# Patient Record
Sex: MALE | Race: OTHER | NOT HISPANIC OR LATINO
[De-identification: names, ages, dates, MRNs, and addresses within clinical notes are randomized per-mention and may not be internally consistent; named-entity substitution may affect disease eponyms.]

---

## 2021-07-14 PROBLEM — Z00.00 ENCOUNTER FOR PREVENTIVE HEALTH EXAMINATION: Status: ACTIVE | Noted: 2021-07-14

## 2021-07-16 ENCOUNTER — APPOINTMENT (OUTPATIENT)
Dept: GASTROENTEROLOGY | Facility: CLINIC | Age: 56
End: 2021-07-16
Payer: MEDICAID

## 2021-07-16 VITALS
TEMPERATURE: 97.3 F | RESPIRATION RATE: 16 BRPM | SYSTOLIC BLOOD PRESSURE: 144 MMHG | DIASTOLIC BLOOD PRESSURE: 78 MMHG | BODY MASS INDEX: 31.18 KG/M2 | HEART RATE: 78 BPM | OXYGEN SATURATION: 98 % | WEIGHT: 194 LBS | HEIGHT: 66 IN

## 2021-07-16 DIAGNOSIS — K21.9 GASTRO-ESOPHAGEAL REFLUX DISEASE W/OUT ESOPHAGITIS: ICD-10-CM

## 2021-07-16 DIAGNOSIS — I10 ESSENTIAL (PRIMARY) HYPERTENSION: ICD-10-CM

## 2021-07-16 DIAGNOSIS — Z78.9 OTHER SPECIFIED HEALTH STATUS: ICD-10-CM

## 2021-07-16 DIAGNOSIS — E78.5 HYPERLIPIDEMIA, UNSPECIFIED: ICD-10-CM

## 2021-07-16 DIAGNOSIS — E11.9 TYPE 2 DIABETES MELLITUS W/OUT COMPLICATIONS: ICD-10-CM

## 2021-07-16 DIAGNOSIS — Z12.11 ENCOUNTER FOR SCREENING FOR MALIGNANT NEOPLASM OF COLON: ICD-10-CM

## 2021-07-16 PROCEDURE — 99204 OFFICE O/P NEW MOD 45 MIN: CPT

## 2021-07-16 RX ORDER — SIMVASTATIN 20 MG/1
20 TABLET, FILM COATED ORAL
Refills: 0 | Status: ACTIVE | COMMUNITY

## 2021-07-16 RX ORDER — RAMIPRIL 2.5 MG/1
2.5 CAPSULE ORAL
Refills: 0 | Status: ACTIVE | COMMUNITY

## 2021-07-16 RX ORDER — SODIUM SULFATE, POTASSIUM SULFATE, MAGNESIUM SULFATE 17.5; 3.13; 1.6 G/ML; G/ML; G/ML
17.5-3.13-1.6 SOLUTION, CONCENTRATE ORAL
Qty: 1 | Refills: 0 | Status: ACTIVE | COMMUNITY
Start: 2021-07-16 | End: 1900-01-01

## 2021-07-16 RX ORDER — METFORMIN HYDROCHLORIDE 1000 MG/1
1000 TABLET, COATED ORAL
Refills: 0 | Status: ACTIVE | COMMUNITY

## 2021-07-16 NOTE — ASSESSMENT
[FreeTextEntry1] : 55-year-old male average risk for colon cancer and polyps\par Minimal reflux complaints no indication for endoscopy at this time\par \par Plan\par Indications risks benefits and alternatives to colonoscopy reviewed with patient\par He is agreeable to examination

## 2021-07-16 NOTE — HISTORY OF PRESENT ILLNESS
[de-identified] : 55-year-old male presents for evaluation prior to screening colonoscopy\par Denies any bowel complaints, no constipation or bleeding\par Denies family history of colon cancer\par No prior colonoscopy\par \par Rare reflux complaints\par Uses over-the-counter Prevacid as needed approximately once per week, particularly if he is eating late\par \par Denies history of coronary disease congestive heart failure or dysrhythmia\par \par \par Social history non-smoker

## 2021-07-16 NOTE — REASON FOR VISIT
[Consultation] : a consultation visit [Spouse] : spouse [FreeTextEntry1] : Screening evaluation, acid reflux

## 2021-10-14 DIAGNOSIS — Z01.818 ENCOUNTER FOR OTHER PREPROCEDURAL EXAMINATION: ICD-10-CM

## 2021-10-15 ENCOUNTER — APPOINTMENT (OUTPATIENT)
Dept: DISASTER EMERGENCY | Facility: CLINIC | Age: 56
End: 2021-10-15

## 2021-10-17 LAB — SARS-COV-2 N GENE NPH QL NAA+PROBE: NOT DETECTED

## 2021-10-18 ENCOUNTER — OUTPATIENT (OUTPATIENT)
Dept: OUTPATIENT SERVICES | Facility: HOSPITAL | Age: 56
LOS: 1 days | End: 2021-10-18
Payer: MEDICAID

## 2021-10-18 ENCOUNTER — APPOINTMENT (OUTPATIENT)
Dept: GASTROENTEROLOGY | Facility: HOSPITAL | Age: 56
End: 2021-10-18

## 2021-10-18 ENCOUNTER — RESULT REVIEW (OUTPATIENT)
Age: 56
End: 2021-10-18

## 2021-10-18 VITALS
TEMPERATURE: 97 F | WEIGHT: 190.04 LBS | RESPIRATION RATE: 18 BRPM | HEART RATE: 72 BPM | SYSTOLIC BLOOD PRESSURE: 163 MMHG | HEIGHT: 66 IN | OXYGEN SATURATION: 100 % | DIASTOLIC BLOOD PRESSURE: 100 MMHG

## 2021-10-18 VITALS
RESPIRATION RATE: 16 BRPM | DIASTOLIC BLOOD PRESSURE: 94 MMHG | SYSTOLIC BLOOD PRESSURE: 138 MMHG | OXYGEN SATURATION: 98 % | HEART RATE: 63 BPM

## 2021-10-18 DIAGNOSIS — Z12.11 ENCOUNTER FOR SCREENING FOR MALIGNANT NEOPLASM OF COLON: ICD-10-CM

## 2021-10-18 LAB — GLUCOSE BLDC GLUCOMTR-MCNC: 267 MG/DL — HIGH (ref 70–99)

## 2021-10-18 PROCEDURE — 88305 TISSUE EXAM BY PATHOLOGIST: CPT

## 2021-10-18 PROCEDURE — 45385 COLONOSCOPY W/LESION REMOVAL: CPT

## 2021-10-18 PROCEDURE — 45385 COLONOSCOPY W/LESION REMOVAL: CPT | Mod: PT

## 2021-10-18 PROCEDURE — 82962 GLUCOSE BLOOD TEST: CPT

## 2021-10-18 PROCEDURE — 88305 TISSUE EXAM BY PATHOLOGIST: CPT | Mod: 26

## 2021-10-18 PROCEDURE — 45380 COLONOSCOPY AND BIOPSY: CPT | Mod: PT,XS

## 2021-10-18 RX ORDER — SODIUM CHLORIDE 9 MG/ML
500 INJECTION INTRAMUSCULAR; INTRAVENOUS; SUBCUTANEOUS
Refills: 0 | Status: DISCONTINUED | OUTPATIENT
Start: 2021-10-18 | End: 2021-11-01

## 2021-10-18 NOTE — PRE PROCEDURE NOTE - PRE PROCEDURE EVALUATION
Attending Physician:      Dr. Hitchcock                      Procedure: Colonoscopy    Indication for Procedure: CRC screening  ________________________________________________________  PAST MEDICAL & SURGICAL HISTORY:  Essential hypertension    Type 2 diabetes mellitus without complication    DM (diabetes mellitus)    No significant past surgical history      ALLERGIES:  No Known Allergies    HOME MEDICATIONS:  metFORMIN 500 mg oral tablet: 1 tab(s) orally 2 times a day  ramipril 2.5 mg oral capsule: 1 cap(s) orally once a day    AICD/PPM: [ ] yes   [ x] no    PERTINENT LAB DATA:                      PHYSICAL EXAMINATION:    Height (cm): 167.6  Weight (kg): 86.2  BMI (kg/m2): 30.7  BSA (m2): 1.96T(C): 36  HR: 72  BP: 163/100  RR: 18  SpO2: 100%    Constitutional: NAD    Neck:  No JVD  Respiratory: CTAB/L  Cardiovascular: S1 and S2  Gastrointestinal: BS+, soft, NT/ND  Extremities: No peripheral edema  Neurological: A/O x 3, no focal deficits        COMMENTS:    The patient is a suitable candidate for the planned procedure unless box checked [ ]  No, explain:

## 2021-10-18 NOTE — PRE-ANESTHESIA EVALUATION ADULT - NSANTHOSAYNRD_GEN_A_CORE
No. TOMASZ screening performed.  STOP BANG Legend: 0-2 = LOW Risk; 3-4 = INTERMEDIATE Risk; 5-8 = HIGH Risk

## 2021-10-18 NOTE — PRE-ANESTHESIA EVALUATION ADULT - NSANTHINDVINFOSD_GEN_ALL_CORE
Health Maintenance   Topic Date Due    Colon cancer screen colonoscopy  05/31/1993    Flu vaccine (1) 12/12/2017 (Originally 9/1/2017)    Breast cancer screen  10/09/2019    DTaP/Tdap/Td vaccine (2 - Td) 10/20/2019    Lipid screen  02/05/2020    Zostavax vaccine  Addressed    DEXA (modify frequency per FRAX score)  Completed    Pneumococcal low/med risk  Completed             (applicable per patient's age: Cancer Screenings, Depression Screening, Fall Risk Screening, Immunizations)    LDL Cholesterol (mg/dL)   Date Value   02/05/2015 178 (H)     AST (U/L)   Date Value   06/23/2016 12     ALT (U/L)   Date Value   06/23/2016 7     BUN (mg/dL)   Date Value   02/02/2017 14      (goal A1C is < 7)   (goal LDL is <100) need 30-50% reduction from baseline     BP Readings from Last 3 Encounters:   09/12/17 119/83   03/07/17 140/85   02/03/17 112/69    (goal /80)      All Future Testing planned in [facility]:  Lab Frequency Next Occurrence   POCT Fecal Immunochemical Test (FIT) Once 06/04/2018   Lipid Panel Once 06/20/2018       Next Visit Date:  No future appointments.          Patient Active Problem List:     GERD (gastroesophageal reflux disease)     Atrial fibrillation (HCC)     Hypercholesteremia     Essential hypertension     Breast cancer screening     Atrial fibrillation with RVR (HCC)     Fatigue     Tachycardia     Neck pain patient

## 2021-10-19 ENCOUNTER — TRANSCRIPTION ENCOUNTER (OUTPATIENT)
Age: 56
End: 2021-10-19

## 2021-10-20 ENCOUNTER — NON-APPOINTMENT (OUTPATIENT)
Age: 56
End: 2021-10-20

## 2021-10-20 LAB — SURGICAL PATHOLOGY STUDY: SIGNIFICANT CHANGE UP

## 2024-03-26 PROBLEM — E11.9 TYPE 2 DIABETES MELLITUS WITHOUT COMPLICATIONS: Chronic | Status: ACTIVE | Noted: 2021-10-18

## 2024-10-10 ENCOUNTER — APPOINTMENT (OUTPATIENT)
Dept: ORTHOPEDIC SURGERY | Facility: CLINIC | Age: 59
End: 2024-10-10
Payer: MEDICAID

## 2024-10-10 ENCOUNTER — NON-APPOINTMENT (OUTPATIENT)
Age: 59
End: 2024-10-10

## 2024-10-10 VITALS — HEIGHT: 65 IN | BODY MASS INDEX: 31.65 KG/M2 | WEIGHT: 190 LBS

## 2024-10-10 DIAGNOSIS — M54.50 LOW BACK PAIN, UNSPECIFIED: ICD-10-CM

## 2024-10-10 DIAGNOSIS — M51.369: ICD-10-CM

## 2024-10-10 PROCEDURE — 72100 X-RAY EXAM L-S SPINE 2/3 VWS: CPT

## 2024-10-10 PROCEDURE — 99204 OFFICE O/P NEW MOD 45 MIN: CPT

## 2024-10-10 RX ORDER — DICLOFENAC SODIUM 75 MG/1
75 TABLET, DELAYED RELEASE ORAL
Qty: 60 | Refills: 0 | Status: ACTIVE | COMMUNITY
Start: 2024-10-10 | End: 1900-01-01

## 2024-11-07 ENCOUNTER — APPOINTMENT (OUTPATIENT)
Dept: ORTHOPEDIC SURGERY | Facility: CLINIC | Age: 59
End: 2024-11-07